# Patient Record
Sex: FEMALE | Race: WHITE | NOT HISPANIC OR LATINO | URBAN - METROPOLITAN AREA
[De-identification: names, ages, dates, MRNs, and addresses within clinical notes are randomized per-mention and may not be internally consistent; named-entity substitution may affect disease eponyms.]

---

## 2020-03-13 ENCOUNTER — PREPPED CHART (OUTPATIENT)
Dept: URBAN - METROPOLITAN AREA CLINIC 93 | Facility: CLINIC | Age: 60
End: 2020-03-13

## 2020-03-13 PROBLEM — H43.392 VITREOUS FLOATERS: Status: STABILIZING | Noted: 2020-03-13

## 2020-03-13 PROBLEM — H43.392 VITREOUS FLOATERS: Noted: 2020-03-13

## 2020-03-13 PROBLEM — H04.123 DRY EYE SYNDROME: Noted: 2020-03-13

## 2020-03-13 PROBLEM — H25.093 INCIPIENT SENILE CATARACT: Noted: 2020-03-13

## 2020-03-13 PROBLEM — H25.093 INCIPIENT SENILE CATARACT: Status: ACTIVE | Noted: 2020-03-13

## 2020-03-13 PROBLEM — H04.123 DRY EYE SYNDROME: Status: STABILIZING | Noted: 2020-03-13

## 2020-03-13 PROBLEM — H52.4 PRESBYOPIA: Noted: 2020-03-13

## 2020-03-13 PROBLEM — H04.123 DRY EYE SYNDROME: Status: ACTIVE | Noted: 2020-03-13

## 2022-08-25 ENCOUNTER — APPOINTMENT (OUTPATIENT)
Dept: URBAN - METROPOLITAN AREA CLINIC 276 | Age: 62
Setting detail: DERMATOLOGY
End: 2022-09-01

## 2022-08-25 DIAGNOSIS — L81.4 OTHER MELANIN HYPERPIGMENTATION: ICD-10-CM

## 2022-08-25 DIAGNOSIS — L81.1 CHLOASMA: ICD-10-CM

## 2022-08-25 PROCEDURE — OTHER PRESCRIPTION: OTHER

## 2022-08-25 PROCEDURE — 99203 OFFICE O/P NEW LOW 30 MIN: CPT

## 2022-08-25 PROCEDURE — OTHER MIPS QUALITY: OTHER

## 2022-08-25 PROCEDURE — OTHER COUNSELING: OTHER

## 2022-08-25 RX ORDER — FLUOCINOLONE ACETONIDE, HYDROQUINONE, AND TRETINOIN .1; 40; .5 MG/G; MG/G; MG/G
CREAM TOPICAL QHS
Qty: 30 | Refills: 2 | Status: ERX | COMMUNITY
Start: 2022-08-25

## 2022-08-25 ASSESSMENT — LOCATION DETAILED DESCRIPTION DERM
LOCATION DETAILED: INFERIOR MID FOREHEAD
LOCATION DETAILED: LEFT INFERIOR CENTRAL MALAR CHEEK
LOCATION DETAILED: RIGHT INFERIOR CENTRAL MALAR CHEEK

## 2022-08-25 ASSESSMENT — LOCATION SIMPLE DESCRIPTION DERM
LOCATION SIMPLE: LEFT CHEEK
LOCATION SIMPLE: RIGHT CHEEK
LOCATION SIMPLE: INFERIOR FOREHEAD

## 2022-08-25 ASSESSMENT — LOCATION ZONE DERM: LOCATION ZONE: FACE

## 2023-02-10 ENCOUNTER — ESTABLISHED COMPREHENSIVE EXAM (OUTPATIENT)
Dept: URBAN - METROPOLITAN AREA CLINIC 93 | Facility: CLINIC | Age: 63
End: 2023-02-10

## 2023-02-10 DIAGNOSIS — H52.03: ICD-10-CM

## 2023-02-10 DIAGNOSIS — H25.093: ICD-10-CM

## 2023-02-10 DIAGNOSIS — H04.123: ICD-10-CM

## 2023-02-10 DIAGNOSIS — H52.4: ICD-10-CM

## 2023-02-10 DIAGNOSIS — H43.392: ICD-10-CM

## 2023-02-10 DIAGNOSIS — H52.223: ICD-10-CM

## 2023-02-10 PROCEDURE — 92015 DETERMINE REFRACTIVE STATE: CPT

## 2023-02-10 PROCEDURE — 99214 OFFICE O/P EST MOD 30 MIN: CPT

## 2023-02-10 ASSESSMENT — VISUAL ACUITY
OD_SC: 20/60-2
OS_GLARE: 20/200
OS_SC: 20/60-1
OD_GLARE: 20/200

## 2023-02-10 ASSESSMENT — TONOMETRY
OD_IOP_MMHG: 18
OS_IOP_MMHG: 17

## 2023-09-11 ENCOUNTER — APPOINTMENT (OUTPATIENT)
Dept: URBAN - METROPOLITAN AREA CLINIC 276 | Age: 63
Setting detail: DERMATOLOGY
End: 2023-09-11

## 2023-09-11 DIAGNOSIS — L73.8 OTHER SPECIFIED FOLLICULAR DISORDERS: ICD-10-CM

## 2023-09-11 DIAGNOSIS — L81.1 CHLOASMA: ICD-10-CM

## 2023-09-11 PROCEDURE — OTHER DEFER: OTHER

## 2023-09-11 PROCEDURE — OTHER MIPS QUALITY: OTHER

## 2023-09-11 PROCEDURE — 99213 OFFICE O/P EST LOW 20 MIN: CPT

## 2023-09-11 PROCEDURE — OTHER PRESCRIPTION: OTHER

## 2023-09-11 PROCEDURE — OTHER COUNSELING: OTHER

## 2023-09-11 PROCEDURE — OTHER SUNSCREEN RECOMMENDATIONS: OTHER

## 2023-09-11 PROCEDURE — OTHER PHOTO-DOCUMENTATION: OTHER

## 2023-09-11 RX ORDER — FLUOCINOLONE ACETONIDE, HYDROQUINONE, AND TRETINOIN .1; 40; .5 MG/G; MG/G; MG/G
CREAM TOPICAL QHS
Qty: 30 | Refills: 3 | Status: ERX | COMMUNITY
Start: 2023-09-11

## 2023-09-11 ASSESSMENT — LOCATION SIMPLE DESCRIPTION DERM
LOCATION SIMPLE: LEFT CHEEK
LOCATION SIMPLE: RIGHT CHEEK
LOCATION SIMPLE: INFERIOR FOREHEAD

## 2023-09-11 ASSESSMENT — LOCATION DETAILED DESCRIPTION DERM
LOCATION DETAILED: RIGHT CENTRAL MALAR CHEEK
LOCATION DETAILED: INFERIOR MID FOREHEAD
LOCATION DETAILED: LEFT CENTRAL MALAR CHEEK

## 2023-09-11 ASSESSMENT — LOCATION ZONE DERM: LOCATION ZONE: FACE

## 2023-09-26 ENCOUNTER — APPOINTMENT (OUTPATIENT)
Dept: URBAN - METROPOLITAN AREA CLINIC 276 | Age: 63
Setting detail: DERMATOLOGY
End: 2023-09-26

## 2023-09-26 DIAGNOSIS — L73.8 OTHER SPECIFIED FOLLICULAR DISORDERS: ICD-10-CM

## 2023-09-26 PROCEDURE — OTHER BENIGN DESTRUCTION COSMETIC: OTHER

## 2023-09-26 PROCEDURE — OTHER COUNSELING: OTHER

## 2023-09-26 ASSESSMENT — LOCATION ZONE DERM: LOCATION ZONE: FACE

## 2023-09-26 ASSESSMENT — LOCATION DETAILED DESCRIPTION DERM
LOCATION DETAILED: LEFT INFERIOR CENTRAL MALAR CHEEK
LOCATION DETAILED: LEFT MEDIAL FOREHEAD
LOCATION DETAILED: RIGHT INFERIOR CENTRAL MALAR CHEEK
LOCATION DETAILED: RIGHT MEDIAL FOREHEAD
LOCATION DETAILED: LEFT MID TEMPLE
LOCATION DETAILED: RIGHT INFERIOR MEDIAL MALAR CHEEK

## 2023-09-26 ASSESSMENT — LOCATION SIMPLE DESCRIPTION DERM
LOCATION SIMPLE: LEFT FOREHEAD
LOCATION SIMPLE: LEFT TEMPLE
LOCATION SIMPLE: LEFT CHEEK
LOCATION SIMPLE: RIGHT FOREHEAD
LOCATION SIMPLE: RIGHT CHEEK

## 2023-09-26 NOTE — PROCEDURE: BENIGN DESTRUCTION COSMETIC
Anesthesia Type: 1% lidocaine with epinephrine
Price (Use Numbers Only, No Special Characters Or $): 363.21
Post-Care Instructions: I reviewed with the patient in detail post-care instructions. Patient is to wear sunprotection, and avoid picking at any of the treated lesions. Pt may apply Vaseline to crusted or scabbing areas.
Detail Level: Simple
Consent: The patient's consent was obtained including but not limited to risks of crusting, scabbing, blistering, scarring, darker or lighter pigmentary change, recurrence, incomplete removal and infection.
Anesthesia Volume In Cc: 1.5
PACU

## 2024-02-12 ENCOUNTER — ESTABLISHED COMPREHENSIVE EXAM (OUTPATIENT)
Dept: URBAN - METROPOLITAN AREA CLINIC 93 | Facility: CLINIC | Age: 64
End: 2024-02-12

## 2024-02-12 DIAGNOSIS — H25.13: ICD-10-CM

## 2024-02-12 DIAGNOSIS — H43.392: ICD-10-CM

## 2024-02-12 DIAGNOSIS — H04.123: ICD-10-CM

## 2024-02-12 DIAGNOSIS — H52.223: ICD-10-CM

## 2024-02-12 DIAGNOSIS — R73.09: ICD-10-CM

## 2024-02-12 DIAGNOSIS — H52.4: ICD-10-CM

## 2024-02-12 DIAGNOSIS — H52.03: ICD-10-CM

## 2024-02-12 DIAGNOSIS — H10.45: ICD-10-CM

## 2024-02-12 PROCEDURE — 99214 OFFICE O/P EST MOD 30 MIN: CPT

## 2024-02-12 PROCEDURE — 92015 DETERMINE REFRACTIVE STATE: CPT | Mod: GY

## 2024-02-12 ASSESSMENT — VISUAL ACUITY
OD_PH: 20/30-1
OS_PH: 20/30
OD_SC: 20/50
OS_SC: 20/40-1

## 2024-02-12 ASSESSMENT — TONOMETRY
OD_IOP_MMHG: 16
OS_IOP_MMHG: 16

## 2024-04-09 NOTE — PROCEDURE: DEFER
Size Of Lesion In Cm (Optional): 0
Detail Level: Zone
Instructions (Optional): Cosmetic destruction $363.21 (<15 lesions)
Introduction Text (Please End With A Colon): The following procedure was deferred:
Attending Attestation (For Attendings USE Only)...

## 2025-02-03 ENCOUNTER — ESTABLISHED COMPREHENSIVE EXAM (OUTPATIENT)
Dept: URBAN - METROPOLITAN AREA CLINIC 93 | Facility: CLINIC | Age: 65
End: 2025-02-03

## 2025-02-03 DIAGNOSIS — H52.4: ICD-10-CM

## 2025-02-03 DIAGNOSIS — H10.13: ICD-10-CM

## 2025-02-03 DIAGNOSIS — H52.02: ICD-10-CM

## 2025-02-03 DIAGNOSIS — H04.123: ICD-10-CM

## 2025-02-03 DIAGNOSIS — R73.09: ICD-10-CM

## 2025-02-03 DIAGNOSIS — H25.13: ICD-10-CM

## 2025-02-03 DIAGNOSIS — H52.223: ICD-10-CM

## 2025-02-03 DIAGNOSIS — H43.392: ICD-10-CM

## 2025-02-03 PROCEDURE — 92015 DETERMINE REFRACTIVE STATE: CPT

## 2025-02-03 PROCEDURE — 99214 OFFICE O/P EST MOD 30 MIN: CPT

## 2025-02-03 ASSESSMENT — TONOMETRY
OD_IOP_MMHG: 18
OS_IOP_MMHG: 18

## 2025-02-03 ASSESSMENT — VISUAL ACUITY
OD_SC: 20/50
OS_SC: 20/50